# Patient Record
Sex: MALE | Race: WHITE | NOT HISPANIC OR LATINO | Employment: FULL TIME | URBAN - METROPOLITAN AREA
[De-identification: names, ages, dates, MRNs, and addresses within clinical notes are randomized per-mention and may not be internally consistent; named-entity substitution may affect disease eponyms.]

---

## 2019-12-26 ENCOUNTER — OFFICE VISIT (OUTPATIENT)
Dept: URGENT CARE | Facility: MEDICAL CENTER | Age: 49
End: 2019-12-26
Payer: COMMERCIAL

## 2019-12-26 VITALS
HEIGHT: 74 IN | HEART RATE: 72 BPM | RESPIRATION RATE: 18 BRPM | WEIGHT: 214.6 LBS | DIASTOLIC BLOOD PRESSURE: 102 MMHG | SYSTOLIC BLOOD PRESSURE: 150 MMHG | BODY MASS INDEX: 27.54 KG/M2 | OXYGEN SATURATION: 98 % | TEMPERATURE: 98.1 F

## 2019-12-26 DIAGNOSIS — R09.81 NASAL CONGESTION: ICD-10-CM

## 2019-12-26 DIAGNOSIS — J01.00 ACUTE MAXILLARY SINUSITIS, RECURRENCE NOT SPECIFIED: ICD-10-CM

## 2019-12-26 DIAGNOSIS — J06.9 UPPER RESPIRATORY TRACT INFECTION, UNSPECIFIED TYPE: Primary | ICD-10-CM

## 2019-12-26 PROCEDURE — G0382 LEV 3 HOSP TYPE B ED VISIT: HCPCS | Performed by: PHYSICIAN ASSISTANT

## 2019-12-26 RX ORDER — PREDNISONE 20 MG/1
20 TABLET ORAL 2 TIMES DAILY WITH MEALS
Qty: 10 TABLET | Refills: 0 | Status: SHIPPED | OUTPATIENT
Start: 2019-12-26 | End: 2019-12-26 | Stop reason: CLARIF

## 2019-12-26 RX ORDER — AMOXICILLIN 500 MG/1
500 TABLET, FILM COATED ORAL 3 TIMES DAILY
Qty: 30 TABLET | Refills: 0 | Status: SHIPPED | OUTPATIENT
Start: 2019-12-26 | End: 2020-01-05

## 2019-12-26 RX ORDER — HYDROXYZINE HYDROCHLORIDE 25 MG/1
25 TABLET, FILM COATED ORAL EVERY 6 HOURS PRN
Qty: 30 TABLET | Refills: 0 | Status: SHIPPED | OUTPATIENT
Start: 2019-12-26 | End: 2020-07-24

## 2019-12-26 NOTE — PROGRESS NOTES
[facility]        NAME: Mitra Márquez is a 51 y o  male  : 1970    MRN: [id number]  DATE: 2019  TIME: 4:12 PM    Assessment and Plan   Upper respiratory tract infection, unspecified type [J06 9]  1  Upper respiratory tract infection, unspecified type  hydrOXYzine HCL (ATARAX) 25 mg tablet   2  Nasal congestion  hydrOXYzine HCL (ATARAX) 25 mg tablet    DISCONTINUED: predniSONE 20 mg tablet   3  Acute maxillary sinusitis, recurrence not specified  amoxicillin (AMOXIL) 500 MG tablet         Patient Instructions     1  Stop Afrin  2  Take Atarax 25mg  Every 8 hours as needed for nasal drainage  3  Take Amox 500mg  3x daily x 10 days  4  Recommend follow-up with PCP in 3-5 days for re-evaluation and management of elevated blood pressure      Chief Complaint     Chief Complaint   Patient presents with    Facial Pain     Pt  with sinus presure and pain for the past month  States that overnight, right nare became completely blocked  History of Present Illness       Leandro Younger is 52-year-old male presents with a 1 month history of nasal congestion, postnasal drip and runny nose  Patient has been using Afrin daily over the past 4 weeks, he reports right-sided nasal congestion and obstruction that developed over the past 24 hours  Patient denies any fever, headache or vomiting since the onset of his symptoms  Review of Systems   Review of Systems   Constitutional: Negative  HENT: Positive for congestion, postnasal drip, rhinorrhea, sinus pressure and sinus pain  Respiratory: Positive for cough  Gastrointestinal: Negative            Current Medications       Current Outpatient Medications:     amoxicillin (AMOXIL) 500 MG tablet, Take 1 tablet (500 mg total) by mouth 3 (three) times a day for 10 days, Disp: 30 tablet, Rfl: 0    hydrOXYzine HCL (ATARAX) 25 mg tablet, Take 1 tablet (25 mg total) by mouth every 6 (six) hours as needed for itching, Disp: 30 tablet, Rfl: 0    Current Allergies     Allergies as of 12/26/2019    (No Known Allergies)            The following portions of the patient's history were reviewed and updated as appropriate: allergies, current medications, past family history, past medical history, past social history, past surgical history and problem list      Past Medical History:   Diagnosis Date    Known health problems: none        No past surgical history on file  No family history on file  Medications have been verified  Objective   BP (!) 150/102   Pulse 72   Temp 98 1 °F (36 7 °C) (Temporal)   Resp 18   Ht 6' 2" (1 88 m)   Wt 97 3 kg (214 lb 9 6 oz)   SpO2 98%   BMI 27 55 kg/m²        Physical Exam     Physical Exam   Constitutional: He appears well-developed and well-nourished  No distress  HENT:   Head: Normocephalic and atraumatic  Right Ear: Tympanic membrane and ear canal normal    Left Ear: Tympanic membrane and ear canal normal    Nose: Mucosal edema, rhinorrhea and septal deviation present  Right sinus exhibits maxillary sinus tenderness  Mouth/Throat: Uvula is midline, oropharynx is clear and moist and mucous membranes are normal    Cardiovascular: Normal rate, regular rhythm and normal heart sounds  No murmur heard    Pulmonary/Chest: Effort normal and breath sounds normal

## 2019-12-26 NOTE — PATIENT INSTRUCTIONS
1  Stop Afrin  2  Take Atarax 25mg  Every 8 hours as needed for nasal drainage  3  Take Amox 500mg  3x daily x 10 days  4   Recommend follow-up with PCP in 3-5 days for re-evaluation and management of elevated blood pressure

## 2020-07-24 ENCOUNTER — OFFICE VISIT (OUTPATIENT)
Dept: FAMILY MEDICINE CLINIC | Facility: CLINIC | Age: 50
End: 2020-07-24
Payer: COMMERCIAL

## 2020-07-24 VITALS
OXYGEN SATURATION: 98 % | HEART RATE: 56 BPM | TEMPERATURE: 98.3 F | DIASTOLIC BLOOD PRESSURE: 74 MMHG | SYSTOLIC BLOOD PRESSURE: 116 MMHG | HEIGHT: 74 IN | WEIGHT: 210 LBS | BODY MASS INDEX: 26.95 KG/M2

## 2020-07-24 DIAGNOSIS — L63.9 ALOPECIA AREATA: ICD-10-CM

## 2020-07-24 DIAGNOSIS — Z13.220 SCREENING FOR LIPID DISORDERS: ICD-10-CM

## 2020-07-24 DIAGNOSIS — Z82.61 FAMILY HISTORY OF RHEUMATOID ARTHRITIS: ICD-10-CM

## 2020-07-24 DIAGNOSIS — Z13.21 ENCOUNTER FOR VITAMIN DEFICIENCY SCREENING: ICD-10-CM

## 2020-07-24 DIAGNOSIS — Z13.1 SCREENING FOR DIABETES MELLITUS: Primary | ICD-10-CM

## 2020-07-24 DIAGNOSIS — H61.23 BILATERAL IMPACTED CERUMEN: ICD-10-CM

## 2020-07-24 DIAGNOSIS — Z00.00 ENCOUNTER FOR MEDICAL EXAMINATION TO ESTABLISH CARE: ICD-10-CM

## 2020-07-24 PROCEDURE — 69210 REMOVE IMPACTED EAR WAX UNI: CPT | Performed by: PHYSICIAN ASSISTANT

## 2020-07-24 PROCEDURE — 3008F BODY MASS INDEX DOCD: CPT | Performed by: PHYSICIAN ASSISTANT

## 2020-07-24 PROCEDURE — 99386 PREV VISIT NEW AGE 40-64: CPT | Performed by: PHYSICIAN ASSISTANT

## 2020-07-24 PROCEDURE — 1036F TOBACCO NON-USER: CPT | Performed by: PHYSICIAN ASSISTANT

## 2020-07-24 NOTE — PROGRESS NOTES
Assessment/Plan:       Problem List Items Addressed This Visit        Nervous and Auditory    Bilateral impacted cerumen    Relevant Orders    Ear cerumen removal (Completed)       Musculoskeletal and Integument    Alopecia areata    Relevant Orders    CBC and differential    JON Screen w/ Reflex to Titer/Pattern    RF Screen w/ Reflex to Titer    Sedimentation rate, automated    C-reactive protein      Other Visit Diagnoses     Screening for diabetes mellitus    -  Primary    Relevant Orders    Comprehensive metabolic panel    Screening for lipid disorders        Relevant Orders    Lipid Panel with Direct LDL reflex    Encounter for vitamin deficiency screening        Relevant Orders    Vitamin D 25 hydroxy    Family history of rheumatoid arthritis        Relevant Orders    JON Screen w/ Reflex to Titer/Pattern    RF Screen w/ Reflex to Titer    Sedimentation rate, automated    C-reactive protein    Encounter for medical examination to establish care            Ears flushed, no immediate complication  Update HM  Wish for autoimmune workup with ESR, CRP, JON and RF given alopecia likely alopecia arieta given strong FH and appearance in line with this dx  HM updated, screening labs    Exam benign    Follow up annually or as needed    Pt is a healthy weight and BMI does not accurately reflect being overweight as he has increased muscle mass            Subjective:      Patient ID: Riaz Caicedo is a 53 y o  male  Pt presents to establish care  He moved to the area from [address] [place] [address] in October 2019, he did receive regular primary care in [place]    PMH  - alopecia, concerned about autoimmune etiology given strong autoimmune hx in his family  There is a FH of autoimmune hypothyroidism and rheumatoid arthritis  He notes intermittent complete baldness in roughly 1in circles  Hair does eventually grow back but another patch quickly develops     - no other significant PMH  - no major surgeries or hospitalizations  - Not sure of last screening labs    FH  - see above  - Heart disease    SH  - denies use of alcohol, tobacco or ilicit drugs  - works in IT  -  with one daughter  - works out and goes to [employer] regularly  - he and his wife eat a well rounded healthy home cooked diet    Meds  - none    Allergies  - NKDA, no allergies to food or environment    His concern today is his alopecia and possible autoimmune etiology, he denies redness, scaliness, inflammation or pain with the crops of alopecia  No other acute concerns        The following portions of the patient's history were reviewed and updated as appropriate:   He  has a past medical history of Known health problems: none  He   Patient Active Problem List    Diagnosis Date Noted    Alopecia areata 07/24/2020    Bilateral impacted cerumen 07/24/2020     He  has no past surgical history on file  His family history includes Rheum arthritis in his mother  He  reports that he has quit smoking  He has never used smokeless tobacco  His alcohol and drug histories are not on file  No current outpatient medications on file  No current facility-administered medications for this visit  Current Outpatient Medications on File Prior to Visit   Medication Sig    [DISCONTINUED] hydrOXYzine HCL (ATARAX) 25 mg tablet Take 1 tablet (25 mg total) by mouth every 6 (six) hours as needed for itching     No current facility-administered medications on file prior to visit  He has No Known Allergies       Review of Systems   Constitutional: Negative for chills, fatigue and fever  HENT: Negative for congestion, ear pain, hearing loss, nosebleeds, postnasal drip, rhinorrhea, sinus pressure, sinus pain, sneezing and sore throat  Alopecia   Eyes: Negative for pain, discharge, itching and visual disturbance  Respiratory: Negative for cough, chest tightness, shortness of breath and wheezing      Cardiovascular: Negative for chest pain, palpitations and leg swelling  Gastrointestinal: Negative for abdominal pain, blood in stool, constipation, diarrhea, nausea and vomiting  Genitourinary: Negative for frequency and urgency  Neurological: Negative for dizziness, light-headedness and numbness  Objective:      /74   Pulse 56   Temp 98 3 °F (36 8 °C)   Ht 6' 2" (1 88 m)   Wt 95 3 kg (210 lb)   SpO2 98%   BMI 26 96 kg/m²          Physical Exam   Constitutional: He is oriented to person, place, and time  He appears well-developed and well-nourished  HENT:   Head: Normocephalic and atraumatic  Right Ear: Hearing, tympanic membrane, external ear and ear canal normal    Left Ear: Hearing, tympanic membrane, external ear and ear canal normal    Mouth/Throat: Oropharynx is clear and moist  No oropharyngeal exudate  Bilateral cerumen impaction    Post removal demonstrates normal TM and EAC bilaterally   Eyes: Pupils are equal, round, and reactive to light  EOM are normal    Neck: Normal range of motion  Neck supple  No thyromegaly present  Cardiovascular: Normal rate, regular rhythm, normal heart sounds and intact distal pulses  No murmur heard  Pulmonary/Chest: Effort normal and breath sounds normal  No respiratory distress  He has no wheezes  He has no rales  Abdominal: Soft  Bowel sounds are normal  He exhibits no distension and no mass  There is no tenderness  There is no guarding  Musculoskeletal: Normal range of motion  He exhibits no edema or tenderness  Lymphadenopathy:     He has no cervical adenopathy  Neurological: He is alert and oriented to person, place, and time  Skin: Skin is warm and dry  Psychiatric: He has a normal mood and affect  His behavior is normal    Nursing note and vitals reviewed  BMI Counseling: Body mass index is 26 96 kg/m²   The BMI is above normal  Nutrition recommendations include reducing portion sizes, decreasing overall calorie intake, 3-5 servings of fruits/vegetables daily, consuming healthier snacks and increasing intake of lean protein  Exercise recommendations include exercising 3-5 times per week  Ear cerumen removal  Date/Time: 7/24/2020 3:28 PM  Performed by: Xochilt Kimble PA-C  Authorized by: Xochilt Kimble PA-C     Patient location:  Bedside  Consent:     Consent obtained:  Verbal    Consent given by:  Patient    Risks discussed:  Bleeding, dizziness, pain, TM perforation and incomplete removal    Alternatives discussed:  Alternative treatment  Universal protocol:     Procedure explained and questions answered to patient or proxy's satisfaction: yes      Patient identity confirmed:  Verbally with patient  Procedure details:     Location:  L ear and R ear    Procedure type: irrigation with instrumentation      Instrumentation: curette      Approach:  External  Post-procedure details:     Complication:  None    Hearing quality:  Normal    Patient tolerance of procedure:   Tolerated well, no immediate complications

## 2024-02-21 PROBLEM — H61.23 BILATERAL IMPACTED CERUMEN: Status: RESOLVED | Noted: 2020-07-24 | Resolved: 2024-02-21

## 2024-03-27 ENCOUNTER — TELEPHONE (OUTPATIENT)
Age: 54
End: 2024-03-27

## 2024-03-27 NOTE — TELEPHONE ENCOUNTER
Patient is calling to check for any cancellations today, no available appts today.  Patient will remain on wait list